# Patient Record
(demographics unavailable — no encounter records)

---

## 2024-10-18 NOTE — IMAGING
[de-identified] : Right Knee Exam Alignment: Valgus Effusion: None Atrophy: None                                                 Stable to Varus/valgus stress Posterior Drawer Test: negative Anterior Drawer Test: Negative Knee Extension/Flexion: 0 / 120  Medial/lateral compartments Medial joint line: No Tenderness Lateral joint line: No Tenderness Ana María test: negative  Patellofemoral joint Medial patellar facet: no tenderness Patellar grind: Negative  Tendons: Pes Anserine: No tenderness Gerdys Tubercle/ IT Band: No tenderness Quadriceps Tendon: No Tenderness patellar tendon: no Tenderness Tibial tubercle: not tenderness Calf: no Tenderness  Neurovascular exam Muscle strength: 5/5 Sensation to light touch: intact Distal pulses: 2+   10/18/2024: xrays taken 4v right knee showing severe tricompartmental OA, valgus deformity, lateral joint space collapse

## 2024-10-18 NOTE — HISTORY OF PRESENT ILLNESS
[3] : 3 [0] : 0 [de-identified] : 10/18/24: 73 y/o M presenting for evaluation on his right knee. Has noted deformity to right knee. No significant pain. Will have slight discomfort after long periods of walking. Has been having synvisc injections with good relief.    [] : no [FreeTextEntry6] : no pain just discomfort

## 2024-10-18 NOTE — ASSESSMENT
[FreeTextEntry1] : 73 y/o M with severe R knee OA (asymptomatic)  Reassurance provided, TKA deferred at this time do to absence of significant pain Continue with synvisc injections as scheduled with outside provider Follow up PRN

## 2025-02-21 NOTE — DISCUSSION/SUMMARY
[FreeTextEntry1] : Contin current meds. Advised diet, exercise and wt loss Annual follow up.  [EKG obtained to assist in diagnosis and management of assessed problem(s)] : EKG obtained to assist in diagnosis and management of assessed problem(s)

## 2025-02-21 NOTE — HISTORY OF PRESENT ILLNESS
[FreeTextEntry1] : Here for routine follow up. No new complaints. Feels well. No CP, SOB. Palpitations controlled with Verapamil. Exercises regularly. Labs Nov 2024 reviewed.

## 2025-03-18 NOTE — HISTORY OF PRESENT ILLNESS
[FreeTextEntry1] : Presents for re-eval due to palpitations. He reports episodes of intermittent palps at rest, that seem to have increased somewhat in frequency. Last yest morning.  Few times in past 2 weeks.  Last up to 15 min before resolving on their own.  No assoc symptoms. He is planning a trip to [place] in 2 [place] and is considering canceling due to concern about possible decompensation of his cardiac status. No CP, SOB, dizziness or exertional symptoms. Taking Verapamil but he does forget occasionally.

## 2025-03-18 NOTE — DISCUSSION/SUMMARY
[FreeTextEntry1] : Recommended 14 day cardiac monitor to re-eval. He will consider in part related to his decision regarding travel.  [EKG obtained to assist in diagnosis and management of assessed problem(s)] : EKG obtained to assist in diagnosis and management of assessed problem(s)

## 2025-07-08 NOTE — ASSESSMENT
[FreeTextEntry1] : A/P:  This patient is a 75 yo male PMH of obese, WANG on CPAP, migraines, ED here for follow-up visit  # WANG on CPAP -con't CPAP  # ED- he f/u with urology  # Hyperlipidemia -stable on statin -Continue low cholesterol diet, exercise and strive to maintaining a healthy body weight. Mediterranean diet recommended  #  BMI=30, Weight loss counseling provided, including recommendations to  - Stop eating after  7 pm or 3 hours before bed - Get a good night sleep - Increase physical activity, at least 30 min per day/ 3x/week - Try a diet plan to jump start your weight loss goals (tara Pardo, Weight watchers or nutritionist) - Portion control/ plate method -refer to weight loss center

## 2025-07-08 NOTE — HISTORY OF PRESENT ILLNESS
[FreeTextEntry1] : f/u visit to discuss weight loss [de-identified] : History of Present Illness:  This patient is a 75 yo male here for follow-up visit and to discuss weight loss management  He has a PMH of obese, WANG on CPAP, migraines, ED- he f/u with urology. His BP is stable, EKG with abnormal t waves and pt c/o episodes of tachycardia. He denies CP. I recommended the pt to see cardiology- referral placed. For HLD, he is back on statin.  He f/u with cardiology.  For BMI=30, Weight loss counseling provided, including recommendations to  - Stop eating after  7 pm or 3 hours before bed - Get a good night sleep - Increase physical activity, at least 30 min per day/ 3x/week - Try a diet plan to jump start your weight loss goals (tara Pardo, Weight watchers or nutritionist) - Portion control/ plate method   Significant PMH: obese, WANG on CPAP, migraines Surgical Hx: R foot surgery post fracture, intra-ocular implants  Family Hx: mother- natural causes; father- age 86 Allergies: NKDA Meds:  cialis, amitriptiline- takes for insomnia (buys oversees), statin  Brief Social History: Work- legal  Living situation:  , has 3 children Tobacco Use: Never smoked. EtOH/Drug use: wine with dinner  Immunization: Pneumovax: done covid vaccinated flu vaccinated  Screening: Colon CA Screening: done 10/15/2019, polyp, repeat in 5 yrs Prostate CA screening: done Depression screening: PHQ-2 screen is negative

## 2025-07-08 NOTE — PHYSICAL EXAM
[Well Nourished] : well nourished [Well Developed] : well developed [Well-Appearing] : well-appearing [Normal Sclera/Conjunctiva] : normal sclera/conjunctiva [PERRL] : pupils equal round and reactive to light [EOMI] : extraocular movements intact [Normal Outer Ear/Nose] : the outer ears and nose were normal in appearance [Normal Oropharynx] : the oropharynx was normal [No Lymphadenopathy] : no lymphadenopathy [Supple] : supple [Thyroid Normal, No Nodules] : the thyroid was normal and there were no nodules present [No Respiratory Distress] : no respiratory distress  [No Accessory Muscle Use] : no accessory muscle use [Clear to Auscultation] : lungs were clear to auscultation bilaterally [Normal Rate] : normal rate  [Regular Rhythm] : with a regular rhythm [Normal S1, S2] : normal S1 and S2 [No Murmur] : no murmur heard [No Varicosities] : no varicosities [Pedal Pulses Present] : the pedal pulses are present [No Edema] : there was no peripheral edema [No Palpable Aorta] : no palpable aorta [Soft] : abdomen soft [Non Tender] : non-tender [Non-distended] : non-distended [No Masses] : no abdominal mass palpated [No HSM] : no HSM [Normal Bowel Sounds] : normal bowel sounds [Normal Posterior Cervical Nodes] : no posterior cervical lymphadenopathy [Normal Anterior Cervical Nodes] : no anterior cervical lymphadenopathy [No CVA Tenderness] : no CVA  tenderness [No Spinal Tenderness] : no spinal tenderness [No Joint Swelling] : no joint swelling [Grossly Normal Strength/Tone] : grossly normal strength/tone [No Rash] : no rash [Coordination Grossly Intact] : coordination grossly intact [No Focal Deficits] : no focal deficits [Normal Gait] : normal gait [Deep Tendon Reflexes (DTR)] : deep tendon reflexes were 2+ and symmetric [Normal Affect] : the affect was normal [Normal Insight/Judgement] : insight and judgment were intact [de-identified] : oberweight

## 2025-07-24 NOTE — HISTORY OF PRESENT ILLNESS
[Improved Health] : Improved health [Quality of Life] : Quality of life [Improved Mobility] : Improved mobility [Improve Mood] : Improved mood [Home] : at home, [unfilled] , at the time of the visit. [Medical Office: ([facility])___] : at the medical office located in  [Telehealth (audio & video)] : This visit was provided via telehealth using real-time 2-way audio visual technology. [Verbal consent obtained from patient] : the patient, [unfilled] [FreeTextEntry1] : Patient is a 74-year-old male who presents for initial obesity medicine consultation.

## 2025-07-24 NOTE — HISTORY OF PRESENT ILLNESS
[Improved Health] : Improved health [Quality of Life] : Quality of life [Improved Mobility] : Improved mobility [Improve Mood] : Improved mood [Home] : at home, [unfilled] , at the time of the visit. [Medical Office: ([facility])___] : at the medical office located in  [Telehealth (audio & video)] : This visit was provided via telehealth using real-time 2-way audio visual technology. [Verbal consent obtained from patient] : the patient, [unfilled] [FreeTextEntry1] : Patient is a 74-year-old male who presents for initial obesity medicine consultation.